# Patient Record
Sex: FEMALE | Race: ASIAN | NOT HISPANIC OR LATINO | URBAN - METROPOLITAN AREA
[De-identification: names, ages, dates, MRNs, and addresses within clinical notes are randomized per-mention and may not be internally consistent; named-entity substitution may affect disease eponyms.]

---

## 2024-08-21 ENCOUNTER — EMERGENCY (EMERGENCY)
Facility: HOSPITAL | Age: 39
LOS: 0 days | Discharge: HOME | End: 2024-08-21
Attending: STUDENT IN AN ORGANIZED HEALTH CARE EDUCATION/TRAINING PROGRAM
Payer: COMMERCIAL

## 2024-08-21 VITALS
SYSTOLIC BLOOD PRESSURE: 142 MMHG | HEART RATE: 85 BPM | DIASTOLIC BLOOD PRESSURE: 81 MMHG | RESPIRATION RATE: 18 BRPM | OXYGEN SATURATION: 100 % | TEMPERATURE: 98 F

## 2024-08-21 VITALS
TEMPERATURE: 99 F | DIASTOLIC BLOOD PRESSURE: 89 MMHG | RESPIRATION RATE: 19 BRPM | OXYGEN SATURATION: 100 % | HEART RATE: 109 BPM | HEIGHT: 60 IN | SYSTOLIC BLOOD PRESSURE: 160 MMHG | WEIGHT: 169.98 LBS
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
## 2024-08-21 DIAGNOSIS — S39.012A STRAIN OF MUSCLE, FASCIA AND TENDON OF LOWER BACK, INITIAL ENCOUNTER: ICD-10-CM

## 2024-08-21 DIAGNOSIS — M25.512 PAIN IN LEFT SHOULDER: ICD-10-CM

## 2024-08-21 DIAGNOSIS — M54.9 DORSALGIA, UNSPECIFIED: ICD-10-CM

## 2024-08-21 DIAGNOSIS — M54.6 PAIN IN THORACIC SPINE: ICD-10-CM

## 2024-08-21 DIAGNOSIS — R00.0 TACHYCARDIA, UNSPECIFIED: ICD-10-CM
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
## 2024-08-21 DIAGNOSIS — M54.50 LOW BACK PAIN, UNSPECIFIED: ICD-10-CM

## 2024-08-21 DIAGNOSIS — M54.2 CERVICALGIA: ICD-10-CM

## 2024-08-21 DIAGNOSIS — R07.89 OTHER CHEST PAIN: ICD-10-CM

## 2024-08-21 RX ORDER — IBUPROFEN 200 MG
1 TABLET ORAL
Qty: 28 | Refills: 0
Start: 2024-08-21 | End: 2024-08-27

## 2024-08-21 RX ORDER — LIDOCAINE 5% 5 G/100G
2 CREAM TOPICAL ONCE
Refills: 0 | Status: COMPLETED | OUTPATIENT
Start: 2024-08-21 | End: 2024-08-21

## 2024-08-21 RX ORDER — METHOCARBAMOL 500 MG
2 TABLET ORAL
Qty: 42 | Refills: 0
Start: 2024-08-21 | End: 2024-08-27

## 2024-08-21 RX ORDER — KETOROLAC TROMETHAMINE 10 MG
60 TABLET ORAL ONCE
Refills: 0 | Status: DISCONTINUED | OUTPATIENT
Start: 2024-08-21 | End: 2024-08-21

## 2024-08-21 RX ORDER — METHOCARBAMOL 500 MG
1000 TABLET ORAL ONCE
Refills: 0 | Status: COMPLETED | OUTPATIENT
Start: 2024-08-21 | End: 2024-08-21

## 2024-08-21 RX ORDER — LIDOCAINE 5% 5 G/100G
1 CREAM TOPICAL
Qty: 14 | Refills: 0
Start: 2024-08-21 | End: 2024-08-27

## 2024-08-21 RX ORDER — LIDOCAINE 5% 5 G/100G
1 CREAM TOPICAL ONCE
Refills: 0 | Status: COMPLETED | OUTPATIENT
Start: 2024-08-21 | End: 2024-08-21

## 2024-08-21 RX ADMIN — LIDOCAINE 5% 1 PATCH: 5 CREAM TOPICAL at 22:08

## 2024-08-21 RX ADMIN — LIDOCAINE 5% 2 PATCH: 5 CREAM TOPICAL at 21:15

## 2024-08-21 RX ADMIN — Medication 1000 MILLIGRAM(S): at 21:13

## 2024-08-21 RX ADMIN — Medication 60 MILLIGRAM(S): at 21:13

## 2024-08-21 NOTE — ED PROVIDER NOTE - WET READ LAUNCH FT
There are no Wet Read(s) to document. Dapsone Pregnancy And Lactation Text: This medication is Pregnancy Category C and is not considered safe during pregnancy or breast feeding.
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
## 2024-08-21 NOTE — ED ADULT NURSE NOTE - GASTROINTESTINAL ASSESSMENT
Nutrition Recommendations:  1. Choose desserts that you don't like, so you can give them away. 2. Give away the dessert as soon as you get home. 3. Have a vegetable 1x/day. 4. Measure your peanut butter and limit to 1 Tablespoon. Follow up in 1 month  Call [phone] with any questions or concerns. - - -
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
## 2024-08-21 NOTE — ED PROVIDER NOTE - PATIENT PORTAL LINK FT
You can access the FollowMyHealth Patient Portal offered by [facility] by registering at the following website: [email or web address]. By joining [facility]’s FollowMyHealth portal, you will also be able to view your health information using other applications (apps) compatible with our system.

## 2024-08-21 NOTE — ED ADULT NURSE NOTE - NSFALLUNIVINTERV_ED_ALL_ED
Bed/Stretcher in lowest position, wheels locked, appropriate side rails in place/Call bell, personal items and telephone in reach/Instruct patient to call for assistance before getting out of bed/chair/stretcher/Non-slip footwear applied when patient is off stretcher/[place] to call system/Physically safe environment - no spills, clutter or unnecessary equipment/Purposeful proactive rounding/Room/bathroom lighting operational, light cord in reach
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
## 2024-10-18 ENCOUNTER — EMERGENCY (EMERGENCY)
Facility: HOSPITAL | Age: 39
LOS: 0 days | Discharge: HOME | End: 2024-10-18
Attending: STUDENT IN AN ORGANIZED HEALTH CARE EDUCATION/TRAINING PROGRAM
Payer: MEDICAID

## 2024-10-18 VITALS
RESPIRATION RATE: 19 BRPM | HEART RATE: 73 BPM | OXYGEN SATURATION: 100 % | TEMPERATURE: 98 F | SYSTOLIC BLOOD PRESSURE: 111 MMHG | DIASTOLIC BLOOD PRESSURE: 70 MMHG

## 2024-10-18 VITALS
DIASTOLIC BLOOD PRESSURE: 78 MMHG | TEMPERATURE: 98 F | SYSTOLIC BLOOD PRESSURE: 117 MMHG | RESPIRATION RATE: 18 BRPM | HEART RATE: 72 BPM | OXYGEN SATURATION: 98 % | WEIGHT: 182.1 LBS | HEIGHT: 60 IN

## 2024-10-18 DIAGNOSIS — Z82.49 FAMILY HISTORY OF ISCHEMIC HEART DISEASE AND OTHER DISEASES OF THE CIRCULATORY SYSTEM: ICD-10-CM

## 2024-10-18 DIAGNOSIS — F17.200 NICOTINE DEPENDENCE, UNSPECIFIED, UNCOMPLICATED: ICD-10-CM

## 2024-10-18 DIAGNOSIS — R07.89 OTHER CHEST PAIN: ICD-10-CM

## 2024-10-18 DIAGNOSIS — R07.9 CHEST PAIN, UNSPECIFIED: ICD-10-CM

## 2024-10-18 PROBLEM — Z78.9 OTHER SPECIFIED HEALTH STATUS: Chronic | Status: ACTIVE | Noted: 2024-08-21

## 2024-10-18 PROCEDURE — 71046 X-RAY EXAM CHEST 2 VIEWS: CPT | Mod: 26

## 2024-10-18 PROCEDURE — 93010 ELECTROCARDIOGRAM REPORT: CPT

## 2024-10-18 PROCEDURE — 99284 EMERGENCY DEPT VISIT MOD MDM: CPT | Mod: 25

## 2024-10-18 RX ORDER — LIDOCAINE 50 MG/G
1 CREAM TOPICAL ONCE
Refills: 0 | Status: COMPLETED | OUTPATIENT
Start: 2024-10-18 | End: 2024-10-18

## 2024-10-18 RX ORDER — CYCLOBENZAPRINE HCL 10 MG
10 TABLET ORAL ONCE
Refills: 0 | Status: COMPLETED | OUTPATIENT
Start: 2024-10-18 | End: 2024-10-18

## 2024-10-18 RX ORDER — KETOROLAC TROMETHAMINE 10 MG/1
30 TABLET, FILM COATED ORAL ONCE
Refills: 0 | Status: DISCONTINUED | OUTPATIENT
Start: 2024-10-18 | End: 2024-10-18

## 2024-10-18 RX ADMIN — LIDOCAINE 1 PATCH: 50 CREAM TOPICAL at 09:04

## 2024-10-18 RX ADMIN — KETOROLAC TROMETHAMINE 30 MILLIGRAM(S): 10 TABLET, FILM COATED ORAL at 09:04

## 2024-10-18 RX ADMIN — Medication 10 MILLIGRAM(S): at 09:04

## 2024-10-18 NOTE — ED PROVIDER NOTE - PHYSICAL EXAMINATION
VITAL SIGNS: I have reviewed nursing notes and confirm.  CONSTITUTIONAL: well-appearing, non-toxic, NAD  SKIN: Warm dry, normal skin turgor  HEAD: NCAT  CARD: RRR, no murmurs, rubs or gallops  RESP: clear to ausculation b/l.  No rales, rhonchi, or wheezing.  ABD: soft, + BS, non-tender, non-distended, no rebound or guarding. No CVA tenderness  EXT: Full ROM, no bony tenderness, no pedal edema, no calf tenderness  NEURO: normal motor. normal sensory. . Normal gait.  PSYCH: Cooperative, appropriate.

## 2024-10-18 NOTE — ED ADULT NURSE NOTE - OBJECTIVE STATEMENT
patient alert and oriented x4, came in for chest pain. pt states she woke up with chest pain and intermittent left sided arm pain associated with worsening with deep breaths and nausea. no episodes of vomiting. denies pmh. pt OOB independent with steady gait. pt in no acute respiratory distress or discomfort at this time. fall precautions maintained. safety precautions maintained. EKG completed. pt placed on cardiac monitor and pulse ox. patient alert and oriented x4, came in for chest pain. pt states she woke up with chest pain and intermittent left sided arm pain associated with worsening with deep breaths and nausea. no episodes of vomiting. denies pmh. pt denies SOB, blurred vision, dizziness. pt OOB independent with steady gait. pt in no acute respiratory distress or discomfort at this time. fall precautions maintained. safety precautions maintained. EKG completed. pt placed on cardiac monitor and pulse ox.

## 2024-10-18 NOTE — ED ADULT NURSE NOTE - NSFALLUNIVINTERV_ED_ALL_ED
Bed/Stretcher in lowest position, wheels locked, appropriate side rails in place/Call bell, personal items and telephone in reach/Instruct patient to call for assistance before getting out of bed/chair/stretcher/Non-slip footwear applied when patient is off stretcher/[place] to call system/Physically safe environment - no spills, clutter or unnecessary equipment/Purposeful proactive rounding/Room/bathroom lighting operational, light cord in reach

## 2024-10-18 NOTE — ED ADULT TRIAGE NOTE - CHIEF COMPLAINT QUOTE
BIBEMS c/o intermittent L sided arm and chest pain, worsens with deep breaths since waking up 1 hour ago. Reports nausea. PMH denies.

## 2024-10-18 NOTE — ED PROVIDER NOTE - PATIENT PORTAL LINK FT
You can access the FollowMyHealth Patient Portal offered by [facility] by registering at the following website: [email or web address]. By joining [facility]’s FollowMyHealth portal, you will also be able to view your health information using other applications (apps) compatible with our system.

## 2024-10-18 NOTE — ED PROVIDER NOTE - CLINICAL SUMMARY MEDICAL DECISION MAKING FREE TEXT BOX
- Summary : The patient came in today reporting severe chest pain that woke her up in the morning, and the pain is also present in her arm. There is stiffness and discomfort when moving of the arm. Her family history reveals that her cousin recently had a heart attack at the age of 42, and her mother and grandfather have similar history of heart-related issues. She mentioned that she used to smoke and now occasionally uses vaping devices. The patient denied having any medical problems, allergies or having had any surgeries. Her medication also includes occasional use of Tylenol.  - Chief Complaint (CC) : The patient main complaint is severe chest pain.  - History of Present Illness (HPI) : The patient reported a sudden incubation of severe chest pain that woke her up. The pain is cringe-like, stabbing, and located in the chest area and the arm. She reports that the pain becomes worse when moving around and is reproducible when pressure is applied to it.  - Past Medical History : None reported.  - Past Surgical History : None reported.  - Family History : Her cousin had a heart attack at 42 years old. Mother and grandfather have heart-related issues in their 60s. All occurrences were on the mother's side.  - Social History : The patient used to smoke and currently vapes occasionally.  - Review of Systems : No other complaints have been mentioned.  - Medications : Occasional use of Tylenol.  - Allergies : No known drug allergies.  Objective:  - Diagnostic Results : The patient's EKG results appeared to be normal according to previous tests. However, the patient will undergo an X-ray to examine the heart and lungs and ensure no fluid around the lungs or popped lung.  - Vital Signs : Not mentioned in the conversation.  - Physical Examination (PE) : On examination, the area where the pain is experienced provokes pain upon touch, the patient confirmed increased discomfort with range of motion and stiffness in area of pain.  Assessment:  - Summary : Given the patient's reported pain and the family history of heart diseases, her symptoms raise concerns. However, the normal results integrated by the patient's EKG and other mentioned symptoms strongly suggest it to be a chest wall related pain. She is PERC negative and low risk for PE.  - Problems :  - Chest Pain    - Differential Diagnosis :  - Heart disease, Pneumonia, Chest wall pain    Plan:  - Summary : Considering the symptoms, the patient's age and the normal EKG, the pain is very likely due to a muscular issue in the chest wall. An X-ray is proposed just for confirmation. In terms of treatment, to lessen the pain, a regimen of Tylenol, anti-inflammatories, lidocaine patch, Toradol (injected or oral) is planned. A muscle relaxer is suggested but as it may cause drowsiness, it is left to the patient's preference. The patient will be offered medications for immediate relief while waiting for X-ray results.  - Plan :  - Order an X-ray to take a closer look at the heart and lungs    - Offer immediate relief using Toradol, with a suggestion for either injection or oral intake    - Suggest the use of a lidocaine patch, anti-inflammatories, and Tylenol, to manage the pain    - Consider a muscle relaxer to alleviate muscle spasms, with patient consent about the possibility of drowsiness - Summary : The patient came in today reporting severe chest pain that woke her up in the morning, and the pain is also present in her arm. There is stiffness and discomfort when moving of the arm. Her family history reveals that her cousin recently had a heart attack at the age of 42, and her mother and grandfather have similar history of heart-related issues. She mentioned that she used to smoke and now occasionally uses vaping devices. The patient denied having any medical problems, allergies or having had any surgeries. Her medication also includes occasional use of Tylenol.  - Chief Complaint (CC) : The patient main complaint is severe chest pain.  - History of Present Illness (HPI) : The patient reported a sudden incubation of severe chest pain that woke her up. The pain is cringe-like, stabbing, and located in the chest area and the arm. She reports that the pain becomes worse when moving around and is reproducible when pressure is applied to it.  - Past Medical History : None reported.  - Past Surgical History : None reported.  - Family History : Her cousin had a heart attack at 42 years old. Mother and grandfather have heart-related issues in their 60s. All occurrences were on the mother's side.  - Social History : The patient used to smoke and currently vapes occasionally.  - Review of Systems : No other complaints have been mentioned.  - Medications : Occasional use of Tylenol.  - Allergies : No known drug allergies.  Objective:  - Diagnostic Results : The patient's EKG results appeared to be normal according to previous tests. However, the patient will undergo an X-ray to examine the heart and lungs and ensure no fluid around the lungs or popped lung.  - Vital Signs : Not mentioned in the conversation.  - Physical Examination (PE) : On examination, the area where the pain is experienced provokes pain upon touch, the patient confirmed increased discomfort with range of motion and stiffness in area of pain.  Assessment:  - Summary : Given the patient's reported pain and the family history of heart diseases, her symptoms raise concerns. However, the normal results integrated by the patient's EKG and other mentioned symptoms strongly suggest it to be a chest wall related pain. She is PERC negative and low risk for PE.  - Problems :  - Chest Pain    - Differential Diagnosis :  - Heart disease, Pneumonia, Chest wall pain    Plan:  - Summary : Considering the symptoms, the patient's age and the normal EKG, the pain is very likely due to a muscular issue in the chest wall. An X-ray is proposed just for confirmation. In terms of treatment, to lessen the pain, a regimen of Tylenol, anti-inflammatories, lidocaine patch, Toradol (injected or oral) is planned. A muscle relaxer is suggested but as it may cause drowsiness, it is left to the patient's preference. The patient will be offered medications for immediate relief while waiting for X-ray results.  - Plan :  - Order an X-ray to take a closer look at the heart and lungs    - Offer immediate relief using Toradol, with a suggestion for either injection or oral intake    - Suggest the use of a lidocaine patch, anti-inflammatories, and Tylenol, to manage the pain    - Consider a muscle relaxer to alleviate muscle spasms, with patient consent about the possibility of drowsiness    Patient with reproducible chest wall pain, improved after medications, advised follow-up with outpatient primary care physician continue with current medication regimen return precautions discussed verbalized understanding and agreeable discharge plan.  Follow-up with PCP within 1 week.

## 2024-10-18 NOTE — ED ADULT NURSE NOTE - FINAL NURSING ELECTRONIC SIGNATURE
Refill Request     Patient requesting dose increase    Last Seen: Last Seen Department: 11/10/2023  Last Seen by PCP: 11/10/2023    Last Written: 12/01/23 qty 1 pen    Next Appointment:   No future appointments.    Message to  to schedule appointment.         Requested Prescriptions     Pending Prescriptions Disp Refills    Semaglutide, 1 MG/DOSE, 4 MG/3ML SOPN 3 mL 1     Sig: Inject 1 mg into the skin once a week      
18-Oct-2024 10:42
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
## 2025-05-19 ENCOUNTER — APPOINTMENT (OUTPATIENT)
Dept: ENDOCRINOLOGY | Facility: CLINIC | Age: 40
End: 2025-05-19